# Patient Record
Sex: FEMALE | Race: BLACK OR AFRICAN AMERICAN | NOT HISPANIC OR LATINO | Employment: FULL TIME | URBAN - METROPOLITAN AREA
[De-identification: names, ages, dates, MRNs, and addresses within clinical notes are randomized per-mention and may not be internally consistent; named-entity substitution may affect disease eponyms.]

---

## 2022-03-03 LAB
ABO GROUP BLD: NORMAL
BLD GP AB SCN SERPL QL: NEGATIVE
HBV SURFACE AG SERPL QL IA: NORMAL
HIV 1+2 AB+HIV1 P24 AG SERPL QL IA: NORMAL
RH BLD: POSITIVE
RUBV IGG SERPL IA-ACNC: 2.69

## 2022-09-24 ENCOUNTER — HOSPITAL ENCOUNTER (INPATIENT)
Facility: MEDICAL CENTER | Age: 32
LOS: 1 days | End: 2022-09-25
Attending: OBSTETRICS & GYNECOLOGY | Admitting: OBSTETRICS & GYNECOLOGY
Payer: COMMERCIAL

## 2022-09-24 VITALS
TEMPERATURE: 99.3 F | DIASTOLIC BLOOD PRESSURE: 72 MMHG | HEIGHT: 66 IN | RESPIRATION RATE: 18 BRPM | HEART RATE: 78 BPM | OXYGEN SATURATION: 98 % | BODY MASS INDEX: 27.97 KG/M2 | WEIGHT: 174 LBS | SYSTOLIC BLOOD PRESSURE: 118 MMHG

## 2022-09-24 DIAGNOSIS — Z91.89 AT RISK FOR BREASTFEEDING DIFFICULTY: Primary | ICD-10-CM

## 2022-09-24 DIAGNOSIS — O47.9 UTERINE CONTRACTIONS: ICD-10-CM

## 2022-09-24 LAB
BASOPHILS # BLD AUTO: 0.2 % (ref 0–1.8)
BASOPHILS # BLD: 0.03 K/UL (ref 0–0.12)
EOSINOPHIL # BLD AUTO: 0.03 K/UL (ref 0–0.51)
EOSINOPHIL NFR BLD: 0.2 % (ref 0–6.9)
ERYTHROCYTE [DISTWIDTH] IN BLOOD BY AUTOMATED COUNT: 42.1 FL (ref 35.9–50)
ERYTHROCYTE [DISTWIDTH] IN BLOOD BY AUTOMATED COUNT: 42.8 FL (ref 35.9–50)
HCT VFR BLD AUTO: 33.1 % (ref 37–47)
HCT VFR BLD AUTO: 40.3 % (ref 37–47)
HGB BLD-MCNC: 11.2 G/DL (ref 12–16)
HGB BLD-MCNC: 13.4 G/DL (ref 12–16)
HOLDING TUBE BB 8507: NORMAL
IMM GRANULOCYTES # BLD AUTO: 0.12 K/UL (ref 0–0.11)
IMM GRANULOCYTES NFR BLD AUTO: 0.7 % (ref 0–0.9)
LYMPHOCYTES # BLD AUTO: 0.99 K/UL (ref 1–4.8)
LYMPHOCYTES NFR BLD: 5.9 % (ref 22–41)
MCH RBC QN AUTO: 29.7 PG (ref 27–33)
MCH RBC QN AUTO: 29.9 PG (ref 27–33)
MCHC RBC AUTO-ENTMCNC: 33.3 G/DL (ref 33.6–35)
MCHC RBC AUTO-ENTMCNC: 33.8 G/DL (ref 33.6–35)
MCV RBC AUTO: 88.3 FL (ref 81.4–97.8)
MCV RBC AUTO: 89.4 FL (ref 81.4–97.8)
MONOCYTES # BLD AUTO: 0.79 K/UL (ref 0–0.85)
MONOCYTES NFR BLD AUTO: 4.7 % (ref 0–13.4)
NEUTROPHILS # BLD AUTO: 14.9 K/UL (ref 2–7.15)
NEUTROPHILS NFR BLD: 88.3 % (ref 44–72)
NRBC # BLD AUTO: 0 K/UL
NRBC BLD-RTO: 0 /100 WBC
PLATELET # BLD AUTO: 174 K/UL (ref 164–446)
PLATELET # BLD AUTO: 203 K/UL (ref 164–446)
PMV BLD AUTO: 10.8 FL (ref 9–12.9)
PMV BLD AUTO: 11.1 FL (ref 9–12.9)
RBC # BLD AUTO: 3.75 M/UL (ref 4.2–5.4)
RBC # BLD AUTO: 4.51 M/UL (ref 4.2–5.4)
T PALLIDUM AB SER QL IA: NORMAL
WBC # BLD AUTO: 12.8 K/UL (ref 4.8–10.8)
WBC # BLD AUTO: 16.9 K/UL (ref 4.8–10.8)

## 2022-09-24 PROCEDURE — 36415 COLL VENOUS BLD VENIPUNCTURE: CPT

## 2022-09-24 PROCEDURE — 700105 HCHG RX REV CODE 258: Performed by: OBSTETRICS & GYNECOLOGY

## 2022-09-24 PROCEDURE — 59409 OBSTETRICAL CARE: CPT

## 2022-09-24 PROCEDURE — 700111 HCHG RX REV CODE 636 W/ 250 OVERRIDE (IP): Performed by: OBSTETRICS & GYNECOLOGY

## 2022-09-24 PROCEDURE — 770002 HCHG ROOM/CARE - OB PRIVATE (112)

## 2022-09-24 PROCEDURE — 700111 HCHG RX REV CODE 636 W/ 250 OVERRIDE (IP)

## 2022-09-24 PROCEDURE — 700102 HCHG RX REV CODE 250 W/ 637 OVERRIDE(OP): Performed by: OBSTETRICS & GYNECOLOGY

## 2022-09-24 PROCEDURE — 304965 HCHG RECOVERY SERVICES

## 2022-09-24 PROCEDURE — 86780 TREPONEMA PALLIDUM: CPT

## 2022-09-24 PROCEDURE — A9270 NON-COVERED ITEM OR SERVICE: HCPCS | Performed by: OBSTETRICS & GYNECOLOGY

## 2022-09-24 PROCEDURE — 85027 COMPLETE CBC AUTOMATED: CPT

## 2022-09-24 PROCEDURE — 85025 COMPLETE CBC W/AUTO DIFF WBC: CPT

## 2022-09-24 RX ORDER — OXYTOCIN 10 [USP'U]/ML
INJECTION, SOLUTION INTRAMUSCULAR; INTRAVENOUS
Status: COMPLETED
Start: 2022-09-24 | End: 2022-09-24

## 2022-09-24 RX ORDER — OXYTOCIN 10 [USP'U]/ML
10 INJECTION, SOLUTION INTRAMUSCULAR; INTRAVENOUS
Status: DISCONTINUED | OUTPATIENT
Start: 2022-09-24 | End: 2022-09-24 | Stop reason: HOSPADM

## 2022-09-24 RX ORDER — SODIUM CHLORIDE, SODIUM LACTATE, POTASSIUM CHLORIDE, CALCIUM CHLORIDE 600; 310; 30; 20 MG/100ML; MG/100ML; MG/100ML; MG/100ML
INJECTION, SOLUTION INTRAVENOUS CONTINUOUS
Status: DISCONTINUED | OUTPATIENT
Start: 2022-09-24 | End: 2022-09-24

## 2022-09-24 RX ORDER — SODIUM CHLORIDE, SODIUM LACTATE, POTASSIUM CHLORIDE, CALCIUM CHLORIDE 600; 310; 30; 20 MG/100ML; MG/100ML; MG/100ML; MG/100ML
INJECTION, SOLUTION INTRAVENOUS PRN
Status: DISCONTINUED | OUTPATIENT
Start: 2022-09-24 | End: 2022-09-25 | Stop reason: HOSPADM

## 2022-09-24 RX ORDER — MISOPROSTOL 200 UG/1
800 TABLET ORAL
Status: DISCONTINUED | OUTPATIENT
Start: 2022-09-24 | End: 2022-09-24 | Stop reason: HOSPADM

## 2022-09-24 RX ORDER — ONDANSETRON 2 MG/ML
4 INJECTION INTRAMUSCULAR; INTRAVENOUS EVERY 6 HOURS PRN
Status: DISCONTINUED | OUTPATIENT
Start: 2022-09-24 | End: 2022-09-24 | Stop reason: HOSPADM

## 2022-09-24 RX ORDER — CETIRIZINE HYDROCHLORIDE 10 MG/1
10 TABLET ORAL DAILY
COMMUNITY

## 2022-09-24 RX ORDER — ACETAMINOPHEN 500 MG
1000 TABLET ORAL
Status: DISCONTINUED | OUTPATIENT
Start: 2022-09-24 | End: 2022-09-24 | Stop reason: HOSPADM

## 2022-09-24 RX ORDER — DOCUSATE SODIUM 100 MG/1
100 CAPSULE, LIQUID FILLED ORAL 2 TIMES DAILY PRN
Status: DISCONTINUED | OUTPATIENT
Start: 2022-09-24 | End: 2022-09-25 | Stop reason: HOSPADM

## 2022-09-24 RX ORDER — IBUPROFEN 800 MG/1
800 TABLET ORAL
Status: COMPLETED | OUTPATIENT
Start: 2022-09-24 | End: 2022-09-24

## 2022-09-24 RX ORDER — ACETAMINOPHEN 500 MG
1000 TABLET ORAL EVERY 6 HOURS PRN
Status: DISCONTINUED | OUTPATIENT
Start: 2022-09-24 | End: 2022-09-25 | Stop reason: HOSPADM

## 2022-09-24 RX ORDER — LIDOCAINE HYDROCHLORIDE 10 MG/ML
20 INJECTION, SOLUTION INFILTRATION; PERINEURAL
Status: DISCONTINUED | OUTPATIENT
Start: 2022-09-24 | End: 2022-09-24 | Stop reason: HOSPADM

## 2022-09-24 RX ORDER — TERBUTALINE SULFATE 1 MG/ML
0.25 INJECTION, SOLUTION SUBCUTANEOUS
Status: DISCONTINUED | OUTPATIENT
Start: 2022-09-24 | End: 2022-09-24 | Stop reason: HOSPADM

## 2022-09-24 RX ORDER — MISOPROSTOL 200 UG/1
600 TABLET ORAL
Status: DISCONTINUED | OUTPATIENT
Start: 2022-09-24 | End: 2022-09-25 | Stop reason: HOSPADM

## 2022-09-24 RX ORDER — IBUPROFEN 800 MG/1
800 TABLET ORAL EVERY 8 HOURS PRN
Status: DISCONTINUED | OUTPATIENT
Start: 2022-09-24 | End: 2022-09-25 | Stop reason: HOSPADM

## 2022-09-24 RX ORDER — VITAMIN A ACETATE, BETA CAROTENE, ASCORBIC ACID, CHOLECALCIFEROL, .ALPHA.-TOCOPHEROL ACETATE, DL-, THIAMINE MONONITRATE, RIBOFLAVIN, NIACINAMIDE, PYRIDOXINE HYDROCHLORIDE, FOLIC ACID, CYANOCOBALAMIN, CALCIUM CARBONATE, FERROUS FUMARATE, ZINC OXIDE, CUPRIC OXIDE 3080; 12; 120; 400; 1; 1.84; 3; 20; 22; 920; 25; 200; 27; 10; 2 [IU]/1; UG/1; MG/1; [IU]/1; MG/1; MG/1; MG/1; MG/1; MG/1; [IU]/1; MG/1; MG/1; MG/1; MG/1; MG/1
1 TABLET, FILM COATED ORAL
Status: DISCONTINUED | OUTPATIENT
Start: 2022-09-25 | End: 2022-09-25 | Stop reason: HOSPADM

## 2022-09-24 RX ORDER — MISOPROSTOL 200 UG/1
TABLET ORAL
Status: DISCONTINUED
Start: 2022-09-24 | End: 2022-09-24

## 2022-09-24 RX ORDER — ONDANSETRON 4 MG/1
4 TABLET, ORALLY DISINTEGRATING ORAL EVERY 6 HOURS PRN
Status: DISCONTINUED | OUTPATIENT
Start: 2022-09-24 | End: 2022-09-24 | Stop reason: HOSPADM

## 2022-09-24 RX ORDER — METHYLERGONOVINE MALEATE 0.2 MG/ML
0.2 INJECTION INTRAVENOUS
Status: DISCONTINUED | OUTPATIENT
Start: 2022-09-24 | End: 2022-09-24 | Stop reason: HOSPADM

## 2022-09-24 RX ADMIN — OXYTOCIN 10 UNITS: 10 INJECTION, SOLUTION INTRAMUSCULAR; INTRAVENOUS at 08:28

## 2022-09-24 RX ADMIN — OXYTOCIN 125 ML/HR: 10 INJECTION INTRAVENOUS at 10:07

## 2022-09-24 RX ADMIN — IBUPROFEN 800 MG: 800 TABLET, FILM COATED ORAL at 18:17

## 2022-09-24 RX ADMIN — IBUPROFEN 800 MG: 800 TABLET, FILM COATED ORAL at 10:07

## 2022-09-24 ASSESSMENT — LIFESTYLE VARIABLES
TOTAL SCORE: 0
DOES PATIENT WANT TO STOP DRINKING: NO
HAVE PEOPLE ANNOYED YOU BY CRITICIZING YOUR DRINKING: NO
EVER HAD A DRINK FIRST THING IN THE MORNING TO STEADY YOUR NERVES TO GET RID OF A HANGOVER: NO
EVER_SMOKED: NEVER
EVER FELT BAD OR GUILTY ABOUT YOUR DRINKING: NO
HAVE YOU EVER FELT YOU SHOULD CUT DOWN ON YOUR DRINKING: NO
TOTAL SCORE: 0
CONSUMPTION TOTAL: INCOMPLETE
TOTAL SCORE: 0
ALCOHOL_USE: NO

## 2022-09-24 ASSESSMENT — PATIENT HEALTH QUESTIONNAIRE - PHQ9
SUM OF ALL RESPONSES TO PHQ9 QUESTIONS 1 AND 2: 0
1. LITTLE INTEREST OR PLEASURE IN DOING THINGS: NOT AT ALL
2. FEELING DOWN, DEPRESSED, IRRITABLE, OR HOPELESS: NOT AT ALL

## 2022-09-24 ASSESSMENT — COPD QUESTIONNAIRES
HAVE YOU SMOKED AT LEAST 100 CIGARETTES IN YOUR ENTIRE LIFE: NO/DON'T KNOW
IN THE PAST 12 MONTHS DO YOU DO LESS THAN YOU USED TO BECAUSE OF YOUR BREATHING PROBLEMS: DISAGREE/UNSURE
COPD SCREENING SCORE: 0
DO YOU EVER COUGH UP ANY MUCUS OR PHLEGM?: NO/ONLY WITH OCCASIONAL COLDS OR INFECTIONS
DURING THE PAST 4 WEEKS HOW MUCH DID YOU FEEL SHORT OF BREATH: NONE/LITTLE OF THE TIME

## 2022-09-24 ASSESSMENT — PAIN DESCRIPTION - PAIN TYPE
TYPE: ACUTE PAIN
TYPE: ACUTE PAIN

## 2022-09-24 NOTE — L&D DELIVERY NOTE
Date: 9/24/2022    PREOPERATIVE DIAGNOSIS:   1.  Term intrauterine pregnancy at 40+4 weeks.  2.  Active labor.  3.  Anxiety and depression.    POSTOPERATIVE DIAGNOSIS:   1.  Term intrauterine pregnancy at 40+4 weeks.  2.  Active labor.  3.  Anxiety and depression.    PROCEDURE: Normal spontaneous vaginal delivery.    Primary OB/GYN: Myriam silvestre MD    Delivering Physician: Lori Nogueira MD    Anesthesia: None.    Complications: None.    Estimated blood loss: 100 ml.    PROCEDURE DETAILS:   32 y.o. G 3 para 2-0-0-2 presented to [facility] labor and delivery with a chief complaint of spontaneous rupture membranes and contractions.  She was found to be completely dilated and wanting to push.  The fetal heart tracing prior to delivery demonstrated a normal baseline with moderate variability.  We were unable to determine whether or not decelerations were occurring due to the short duration of the tracing.  1 push prior to delivery with fetal heart tones were in the 80s and dropped down to the 60s.  The patient pushed well and delivered vaginally without epidural anesthesia. The infant was suctioned with the bulb. There was no nuchal cord but a body cord was present.  The infant was placed on the patient's abdomen after delivery. The cord was clamped after a 30 second delay and subsequently cut by the father of the baby. The fundus was firm with massage and IM pitocin. The placenta delivered spontaneously, intact, with normal 3-vessel cord, in carrillo presentation.     There were no cervical, vaginal, or perineal lacerations. Viable female infant (baby girl Jacque) weighs 3385 g (7 lbs, 7.4 oz.) Apgars were 8 and 8 at 1 and 5 minutes of life.    Mother and infant are recovering and doing well at this time. Sponge and needle counts were correct x 1.       Lori Nogueira M.D.

## 2022-09-24 NOTE — DISCHARGE PLANNING
Discharge Planning Assessment Post Partum    Reason for Referral: MOB hx of anxiety, currently on Zoloft  Address: ***  Type of Living Situation:***  Mom Diagnosis: ***  Baby Diagnosis: ***  Primary Language: ***    Name of Baby: ***  Father of the Baby: ***  Involved in baby’s care? ***  Contact Information: ***    Prenatal Care: ***  Mom's PCP: ***  PCP for new baby:***    Support System: ***  Coping/Bonding between mother & baby: ***  Source of Feeding: ***  Supplies for Infant: ***    Mom's Insurance: ***  Baby Covered on Insurance:***  Mother Employed/School: ***  Other children in the home/names & ages: ***    Financial Hardship/Income: ***   Mom's Mental status: ***  Services used prior to admit: ***    CPS History: ***  Psychiatric History: ***  Domestic Violence History: ***  Drug/ETOH History: ***    Resources Provided: ***  Referrals Made: ***     Clearance for Discharge: ***     Ongoing Plan:***

## 2022-09-24 NOTE — PROGRESS NOTES
Patient transferred from labor and delivery to room 334. Patient and  oriented to room and postpartum routine. Assessment done. Patient has no c/o pain. Lochia light  Fundus firm. IV site without redness, swelling or drainage.2nd bag pitocin placed on pump at 125/hr.cuddles alarm is active. Bands matched. Safety and security reviewed. Baby to breast and nursing well.

## 2022-09-24 NOTE — PROGRESS NOTES
0759: Charge RN called to notify me that pt is in active labor and came in SROM. Admitted to 217.   0815: Pt checked and complete.  called to bedside.   0824: Pt delivered viable female. APGARs 8,8.  0847: called to confirm she would still like IV access as pt's bleeding is minimal and stable. IV access still wanted per MD so IV placed in left AC.   0900: No rooms available in  so pt to stay on unit for now.   1100: Pt up to bathroom without issues. Voided and cleaned perineum with alex bottle.  1300:Pt eating lunch from what  brought to bedside.   1430: Pt up to bathroom. Bleeding none to scant.

## 2022-09-24 NOTE — DISCHARGE SUMMARY
Obstetrics Discharge Summary    Admission Date: 2022         Discharge Date: 2022    ADMISSION DIAGNOSIS:  1.  Term intrauterine pregnancy at 40+4 weeks.  2.  Active labor.  3.  Anxiety and depression.    DISCHARGE DIAGNOSIS:  1.  Term intrauterine pregnancy at 40+4 weeks.  2.  Active labor.  3.  Anxiety and depression.    DETAILS OF HOSPITAL STAY  Presenting Problem/History of Present Illness: Contractions.    Hospital Course:  Patient is a 32 y.o. , who presented to [facility] at 40+4 weeks with a chief complaint of contractions.  She was found to be completely dilated.  She had prenatal care with [facility] with Dr. Schofield.  Her pregnancy was complicated by anxiety and depression. For full details of the delivery please refer to the delivery note dictation. Briefly, the patient underwent an uncomplicated normal spontaneous vaginal delivery. There were no complications. Estimated blood loss was 100 ml. The patient delivered a viable female (baby girl Jacque) infant weighing 3,385g (7lbs 7.4oz.) with Apgars as below in delivery summary. Patient’s recovery and postpartum course were unremarkable. By postpartum day #1 patient met all appropriate milestones and was stable to be discharged to home.    APGARs:   8    8     COMPLICATIONS: None.    PHYSICAL EXAM:  Vitals:   Vitals:    22 1800   BP: 118/72   Pulse: 78   Resp: 18   Temp: 37.4 °C (99.3 °F)   SpO2: 98%   General: Alert, conversational, pleasant, no acute distress  CVS: Regular rate  PULM: No respiratory distress, symmetric expansion  ABD: Soft, non-tender, non-distended, fundus firm, non-tender, below the umbilicus  : Deferred  Extremities: Moves all, trace edema     LABS/STUDIES:    Latest Reference Range & Units 22 09:30 22 21:01   WBC 4.8 - 10.8 K/uL 16.9 (H) 12.8 (H)   RBC 4.20 - 5.40 M/uL 4.51 3.75 (L)   Hemoglobin 12.0 - 16.0 g/dL 13.4 11.2 (L)   Hematocrit 37.0 - 47.0 % 40.3 33.1 (L)   MCV  81.4 - 97.8 fL 89.4 88.3   MCH 27.0 - 33.0 pg 29.7 29.9   MCHC 33.6 - 35.0 g/dL 33.3 (L) 33.8   RDW 35.9 - 50.0 fL 42.8 42.1   Platelet Count 164 - 446 K/uL 203 174   MPV 9.0 - 12.9 fL 10.8 11.1     DISPOSITION: Home.    DISCHARGE MEDICATIONS:  - Ibuprofen 600 mg every 6 hours as needed for pain.    DISCHARGE INSTRUCTIONS:  1. Pelvic rest for 6 weeks postpartum.   2. Postpartum visit in 6 weeks at [facility] [phone].  3. Return to the emergency department if experiencing increased vaginal bleeding, severe pain, temperature greater than 100.4, or any other concerns.    DISCHARGE CONDITION: Stable.    Lori Nogueira M.D.

## 2022-09-24 NOTE — H&P
Date: 2022    Patient ID: 32-year-old  3 para 2-0-0-2 at 40+4 weeks by last menstrual period EDC 2022     Chief complaint: Contractions.    History of present illness: The patient has prenatal care with Dr. Schofield.  Her pregnancy has been uncomplicated.  She does have anxiety and has been taking Zoloft 50 mg once daily.  She had a normal nuchal translucency but did not complete the blood work for the first trimester screen.  Her carrier screen was negative and she is negative for sickle cell trait.  She had a normal anatomy ultrasound and is having a baby girl.  She had a growth ultrasound at 36 weeks with an estimated fetal weight in the 62%.  She is GBS negative.    The patient presented to [facility] labor and delivery with contractions.  She states she underwent spontaneous rupture of membranes at 1230 this morning.  She reports she has been laboring at home.  When she arrived she was complete and ready to push.  She endorsed positive fetal movement.  She denied vaginal bleeding.    Review of systems: Denies fevers, chills, shortness of breath, chest pain, nausea/vomiting, diarrhea or dysuria.    Past medical history: Anxiety and depression.    Past surgical history denies.    Allergies: Keflex (rash).    Medications: Prenatal vitamins, vitamin D3, Zoloft 50 mg once daily.    GYN history: Last menstrual period 2021, denies history of sexually transmitted infections or genital herpes.    OB history:  G1:  term normal spontaneous vaginal delivery, male, 7 pounds 11 ounces, no complications.  G2:  term normal spontaneous vaginal delivery, female 6 pounds 8 ounces, no complications.    Social history: Denies tobacco use, alcohol use or drug use.    Family history: Mother with uterine fibroids and paternal uncle with type 2 diabetes.    Physical exam:  Pending  General: Alert, conversational, pleasant  Cardiovascular: Regular rate  Pulmonary: No respiratory distress, symmetric  expansion  Abdomen: Soft, nontender, nondistended, gravid  Genitourinary: Complete/+1 station  Extremities: Moves all, no edema    Laboratory studies:  Prenatal labs reviewed A+ antibody negative, HIV nonreactive, rubella immune, RPR nonreactive, hepatitis C antibody negative, hepatitis B surface antigen negative, gonorrhea, chlamydia, trichomonas negative, carrier screening negative, varicella immune, vitamin D 37.2, urine culture negative, sickle cell screen negative, 1 hour glucose tolerance test 116, GBS negative.    Imaging: Anatomy ultrasound report shows a single live intrauterine pregnancy with normal anatomy female infant baby girl Jacque.    NST: The patient arrived precipitously and delivered.  There was about 3 minutes of fetal heart tracing prior to delivery.  Moderate variability.  Unable to determine whether decelerations are present.  No accelerations.  Tocometer, contractions every 1 minute.    Assessment/plan:  32-year-old  3 para 2-0-0-2 at 40+4 weeks by last menstrual period EDC 2022  1.  Term intrauterine pregnancy at 40+4 weeks.  2.  Active labor.  3.  Anxiety and depression.    Discussion: The patient arrived and delivered precipitously upon arrival.  Please see delivery note for details.    Plan:  1.  Admit to labor and delivery.  2.  CBC/type and screen.  3.  Routine postpartum orders.

## 2022-09-25 ENCOUNTER — PHARMACY VISIT (OUTPATIENT)
Dept: PHARMACY | Facility: MEDICAL CENTER | Age: 32
End: 2022-09-25
Payer: COMMERCIAL

## 2022-09-25 PROCEDURE — RXMED WILLOW AMBULATORY MEDICATION CHARGE: Performed by: OBSTETRICS & GYNECOLOGY

## 2022-09-25 PROCEDURE — 700102 HCHG RX REV CODE 250 W/ 637 OVERRIDE(OP): Performed by: OBSTETRICS & GYNECOLOGY

## 2022-09-25 PROCEDURE — A9270 NON-COVERED ITEM OR SERVICE: HCPCS | Performed by: OBSTETRICS & GYNECOLOGY

## 2022-09-25 RX ORDER — IBUPROFEN 800 MG/1
800 TABLET ORAL EVERY 8 HOURS PRN
Qty: 21 TABLET | Refills: 0 | Status: SHIPPED | OUTPATIENT
Start: 2022-09-25 | End: 2022-10-02

## 2022-09-25 RX ADMIN — PRENATAL WITH FERROUS FUM AND FOLIC ACID 1 TABLET: 3080; 920; 120; 400; 22; 1.84; 3; 20; 10; 1; 12; 200; 27; 25; 2 TABLET ORAL at 10:21

## 2022-09-25 ASSESSMENT — EDINBURGH POSTNATAL DEPRESSION SCALE (EPDS)
I HAVE FELT SCARED OR PANICKY FOR NO GOOD REASON: NO, NOT AT ALL
I HAVE BEEN ABLE TO LAUGH AND SEE THE FUNNY SIDE OF THINGS: AS MUCH AS I ALWAYS COULD
THINGS HAVE BEEN GETTING ON TOP OF ME: NO, MOST OF THE TIME I HAVE COPED QUITE WELL
I HAVE FELT SAD OR MISERABLE: NO, NOT AT ALL
I HAVE BEEN SO UNHAPPY THAT I HAVE HAD DIFFICULTY SLEEPING: NOT AT ALL
I HAVE BEEN ANXIOUS OR WORRIED FOR NO GOOD REASON: NO, NOT AT ALL
I HAVE LOOKED FORWARD WITH ENJOYMENT TO THINGS: AS MUCH AS I EVER DID
THE THOUGHT OF HARMING MYSELF HAS OCCURRED TO ME: NEVER
I HAVE BLAMED MYSELF UNNECESSARILY WHEN THINGS WENT WRONG: NOT VERY OFTEN
I HAVE BEEN SO UNHAPPY THAT I HAVE BEEN CRYING: NO, NEVER

## 2022-09-25 ASSESSMENT — PAIN DESCRIPTION - PAIN TYPE: TYPE: ACUTE PAIN

## 2022-09-25 NOTE — DISCHARGE PLANNING
Discharge Planning Assessment Post Partum     Reason for Referral: History of depression and anxiety  Address: [address] [place] [address] [place] [address]  Phone: [phone]  Type of Living Situation: living with FOB and children  Mom Diagnosis: Pregnancy  Baby Diagnosis: [place]-40.4 weeks  Primary Language: English     Name of Baby: Jacque Mayberry (: 22)  Father of the Baby: Elvis Mayberry  Involved in baby’s care? Yes  Contact Information: [phone]     Prenatal Care: Yes  Mom's PCP: Unknown  PCP for new baby: Pediatrician list provided     Support System: FOB  Coping/Bonding between mother & baby: Yes  Source of Feeding: breast feeding  Supplies for Infant: prepared for infant     Mom's Insurance: [place]  Baby Covered on Insurance:Yes  Mother Employed/School:  for the [employer]  Other children in the home/names & ages: this is parent's third baby     Financial Hardship/Income: No   Mom's Mental status: alert and oriented  Services used prior to admit: None     CPS History: No  Psychiatric History: history of depression and anxiety.  MOB is taking Zoloft 50 mg and scored a 2 on the EPDS screen  Domestic Violence History: No  Drug/ETOH History: No     Resources Provided: post partum support and counseling resources and a list of pediatricians provided  Referrals Made: None      Clearance for Discharge: Infant is cleared to discharge home with parents once medically cleared

## 2022-09-25 NOTE — PROGRESS NOTES
2030 Pt doing well bonding with baby, Assessment done with light bleeding, up to bathroom and voiding without difficulty, plan of care discussed, Denies pain at this time, Needs attended.

## 2022-09-25 NOTE — DISCHARGE INSTRUCTIONS

## 2022-09-25 NOTE — LACTATION NOTE
This note was copied from a baby's chart.  Initial visit, mother has a strong breastfeeding history, she is noticing some nipple beveling and discomfort with breastfeeding. Mother able to latch baby independently, reviewed asymmetrical latch and emphasizing chin first positioning to obtain deeper latch, nipple very slightly beveled after feeding. MD reports slight tongue tie, baby able to extend tongue over lower gum ridge, parents will get referral tomorrow at peds follow-up for ENT evaluation if latch adjustments not successful in alleviating maternal nipple discomfort. Reviewed milk onset, hunger cues, cluster feeding, frequency/duration of feeding, techniques to deepen latch. Plan to continue cue based breastfeeding at least 8 or more times per 24 hours. Provided list of available outpatient breastfeeding resources. Mother prefers to call to schedule lactation follow-up if desired. Denies questions/concerns.

## 2022-09-25 NOTE — CARE PLAN
Problem: Altered Physiologic Condition  Goal: Patient physiologically stable as evidenced by normal lochia, palpable uterine involution and vitals within normal limits  Outcome: Progressing     Problem: Psychosocial - Postpartum  Goal: Patient will verbalize and demonstrate effective bonding and parenting behavior  Outcome: Progressing   The patient is Stable - Low risk of patient condition declining or worsening pt is hemodynamically stable    Shift Goals: pain controlled, rest  Clinical Goals: maintain uterine good tone, prebvent bladder distension  Patient Goals: pain controlled, breastfeed, rest  Family Goals: rest    Progress made toward(s) clinical / shift goals:  pt verbalized acceptable level of pain    Patient is not progressing towards the following goals:

## 2022-09-26 NOTE — PROGRESS NOTES
1400 DC to home w/ female infant in a car seat w/ fob in stable condition. DC instructions reviewed w/ pt and fob, verbalized understanding. Meds to bed delivered.

## 2022-10-03 ENCOUNTER — OFFICE VISIT (OUTPATIENT)
Dept: OBGYN | Facility: CLINIC | Age: 32
End: 2022-10-03
Payer: COMMERCIAL

## 2022-10-03 DIAGNOSIS — O92.29 SORE NIPPLES DUE TO LACTATION: ICD-10-CM

## 2022-10-03 PROCEDURE — 99215 OFFICE O/P EST HI 40 MIN: CPT | Performed by: NURSE PRACTITIONER

## 2022-10-03 NOTE — PROGRESS NOTES
Summary:Third baby, has never experienced sore nipples but this time nipples have cracked and scabbed, and improving but baby continues with a shallow latch. Exclusively breastfeeding, uses the haakaa for leaking on the opposite side. No bottles. Mom has to wake up Jacque for most feedings.  Today: Oral assessment done as frenulum identified in the hospital. Assisted latched to the right, very full side, less pain, removed 1.2oz. Back to the same breast for practice and breast still full, had to awaken baby, removed 0.7oz and sleeping. Total 1.9oz. Pumping not indicated. Discussed work, pumping and nipple care.   Plan:Deep asymmetrical latch due to neck preference and tightness presenting in the baby. Soothies for nipple care. Introduce bottle by 4 weeks, no alarms at night with baby back to birthweight.   Follow up:   Lactation appointment :as needed, group offered   Pediatrician appointment:14 day [facility]  Referrals :None  Subjective:   Erin Mayberry is a 32 y.o. female here for lactation care. She is here today with baby.    Concerns:   Latch on difficulties , nipple pain , and sleepy baby  HPI:   Pertinent  history:   Mother does not have a history of advanced maternal age, GDM, hypertension prior to pregnancy, insulin resistance, multiple gestation, PCOS, and thyroid disease. Common condition(s) which may interfere with milk supply.    Mother does have advanced maternal age, GDM, hypertension prior to pregnancy, insulin resistance, multiple gestation, PCOS, and thyroid disease. Common condition(s) that may interfere in milk supply.    Breast changes in pregnancy: Yes  History of breast surgeries: No      Other risk factors Infant's MSK tightness    FEEDING HISTORY:    Past breastfeeding history:  First baby  18 months, #2 for 10 months self weaned  Hospital course: Lactation education provided  Currently 10/3/2022 hird baby, has never experienced sore nipples but this time nipples have  cracked and scabbed, and improving but baby continues with a shallow latch. Exclusively breastfeeding, uses the haakaa for leaking on the opposite side. No bottles. Mom has to wake up Jacque for most feedings.     Both breasts: No     Supplement: None    Nipple Shield Use: None    Breast Pumping:   Not pumping  Frequency: Uses the haaka  Type of pump: Has Medela max flow from last baby 19 month ago  Flange size/type: 19mm  NO pain with pumping    Maternal ROS:  Constitutional: No fever, chills. Feeling well  Breasts: No soreness of breasts and Soreness of nipples  Psychiatric: Managing ok  Mental Health: No mention of feeling irritable, agitated, angry, overwhelmed, apathy, exhaustion nor having sleep changes outside infant feeds/demands or appetite changes   Objective:     Maternal Physical Lactation Exam  General: No acute distress  Breasts: Symmetrical , Full, Plugged Duct - no evidence, and Mastitis  - no S/S  Nipples: abraded, cracked, erythematous, and scabbed/crusting  Psychiatric:  Normal mood and affect. Her behavior is normal. Judgment and thought content normal   Mental Health:  Did NOT exhibit sadness, crying, feeling overwhelmed, agitation or hypervigilance.    Assessment/Plan & Lactation Counseling:     Infant exam on infant chart    Infant Weight History:   9/24/22  7#7.4oz  10/3/2022 7#8.3oz day 9 back to birth weight  Infant intake at Breast:: 1.2on + 0.7oz Right breast         Total: 1.9oz  Milk Transfer at this feeding:   Effective breastfeeding  Pumped: not indiated  Initiation of Feeding: Infant initiates  Position of Feeding:    Right: football  Attachment Achieved: rapidly  Nipple shield: N/A       Suck Pattern at the breast: Suck burst and normal rest  Suck Pattern on the bottle: Not indicated  Behavior Following Observed Feeding: sleeping  Nipple Pain from:Contact forces of the tongue causing nipple strain resulting in damage     Latch: Assisted latch and Shallow latch  Suckling/Feeding:  attaches, audible swallows, baby fed effectively, baby roots, elicits RENNY, and rhythmic  Sucking strength:Moderate Strong  Sucking Rhythm Coordinated   Compression: WNL   Once latched, baby fell into a mature and fully integrated SSB pattern.    Swallowing No difficulty noted  Milk Supply Available: normal abundant  Maternal Diagnosis/Problem:  Sore nipples    MATERNAL PERSONALIZED BREASTFEEDING PLAN  Discussed concerns and symptoms as listed above in assessment and guidance summarized below.  Shared decision making was used between myself and the family for this encounter, home care, and follow up.  Topics reviewed included:    4th Trimester: The 12-week period immediately after mom has had the baby. Not everyone has heard of it, but every mother and their  baby will go through it. It is a time of great physical and emotional change as the baby adjusts to being outside the womb, and the family adjusts to new life as parents  During the fourth trimester, one can expect fussiness and crying from the baby and very likely exhaustion for the family.  babies are learning to adjust to life outside the womb where it was warm and squishy!  There is a lot of misinformation about babies and their needs, and parents are often encouraged to ignore baby's signals. Bad idea. Babies are “half-baked” at birth and have much to learn with the help of physical and emotional support from caregivers. Taking care of a baby's needs is an investment that pays off with a happier, healthier child and adult.  It can take weeks or even months for your body to feel totally normal again. There is a major hormonal upheaval experienced by moms in the first few weeks after birth, because their bodies are shifting from many pregnancy hormones to a more normal hormonal make-up.   Sleep or lack of: discussed strategies to manage restorative sleep, although short amounts, significant to the mental health of the mother. The principle  follows:  Mom goes to sleep right after 8pm +/- feeding  Partner covers first late evening feeding (10pm/11pm), settles baby,  then goes to bed  Mom covers next feeding 1am /2am, partner sleeps  Next feeding share   Self -care through relational support and interaction.   Encouraged  support, rest, getting out of the house each day, walk or drive somewhere,  a coffee/tea at a drive through  Allow others to bring you food, help with chores and errands, meeting for a cup of coffee   The nature of infants oral head/neck structure and function and its impact on latch and transfer of milk.   Discussed Mechanical forces resulting in strain patterns during intrauterine life and during birth may negatively affect the oral-motor function of the  and compromise structure and function.  Joint restriction or hypo-mobility could be a logical progression following the relative lack of mobility during the last crowded months of gestation. An exceptionally flexed or rotated fetal posture may tighten myofascial structures in the neck, restricting the hyoid bone and strap muscles that support an effective swallow. More research reveals the body as an integrated whole via its major structure-maintaining and sensory organ, the fascia.  Other musculoskeletal issues, such as neck preferences, may also affect an infant's ability to nurse. These views have expanded and deepened over time.   Neck preference this is a normal  finding that should correct itself over the next few weeks.    However when there is a neck preference it can make latching more difficult.    Mom can  use cross cradle on the left and football on the right    Infant head can be supported in the car seat.    Bottle feeding baby's can be encouraged to look to the opposite side of the preference  Infant can be can talked to from the side encouraging baby to turn to.     Milk supply is dependent on glandular tissue development, hormonal influences, how  many times the baby removes milk and how well the breasts are emptied in a 24 hour period. This is a biological reality that we can NOT work around. If, for any reason, your baby is not latching, or you are not able to nurse, then it is important for you to remove the milk instead by pumping or hand expression.  There's no magic trick, tea, food, drink, cookie or supplement that will increase your milk supply. One  must  effectively remove milk to continue to make and maximize milk. In the early days and weeks that can be 8+ times in 24 hours. For older babies, on average 6-7 + times in 24 hours.     Hydration: Staying hydrated is important however lack of hydration is usually not a cause of significantly low milk production.  Everyone needs a different amount of water, depending on their activity and diet. A high salt and/or high-protein diet, high physical activity, or very warm weather/sweating will require more fluids. A person eating a diet high in veggies and fruit, with a lack of physical activity will require fewer fluids. There is no magic number for the # of ounces of water each day.The best way to know that you are well hydrated is by looking at your urine.  Urine should look clear to light pale yellow, and you should need to pee at least every 3 to 4 hours unless you have a large bladder capacity.     Feeding:   Infant feeding well given current interval growth, guidelines to follow:    Feed your baby every 1.5-3 hours, more often if baby acts hungry.   Awaken baby for feeding if going over 3 hours in the day.   Daily goal: 8-12 feedings per 24 hours.    Given infants weight you may allow baby to go longer at night but that generally means shorter durations in the day.   Supplement:   No supplement is needed   Pacifier Use:  The American Academy of Pediatrics' Position Paper reports: Although we recommend a conservative approach regarding pacifier use, we do not endorse a complete ban on the use of  pacifiers, nor do we support an approach that induces parental guilt concerning their choices about the use of pacifiers. Pacifier use and breastfeeding in term and  newborns- a systematic review and meta-analysis from the  J of Pediatrics Published online 2022. Has found that when pacifiers are used among individuals who have been counseled on the risks, do not interfere with breastfeeding exclusivity or duration. These are parental choices.    Positioning Techniques for bare breast  Pillow used: Marissa Hartley  Suggested positions Cross cradle and Football  Fine tune position by making sure your fingers beneath the breast as well as your bra, are out of the way of your baby's chin.  Positioning: See http://globalhealthmedia.org/portfolio-items/positions-for-breastfeeding/?jlgswrrvqPE=49210   Latch on Techniques for bare breast.   Aim is an asymmetric deep latch.  First make yourself comfortable. Sit with knees bent (unless lying down), feet on a stool if needed. You will support your baby, and pillows will be used to support YOU. You want your baby's belly facing your breast/body (belly to belly). If your baby is extremely fussy and crying, do skin-to-skin for a while until he or she calms down, then try (or try again).   Fine tune latch:  By holding your baby more securely at the breast, assisting your baby to stay attached by:  Support your baby with your hand behind the shoulder blades (NOT THE HEAD).  1. Align your baby's NOSE with your nipple  2. Your baby's chin should be the first part of his or her body to touch your breast  3. Tickle the baby's upper lip or nose with your nipple  4. When your baby's mouth is WIDE OPEN, swiftly bring your baby's mouth over your nipple/areola.  Steps 2 and 3 could be slightly reversed order or essentially happening at the same time.  Bringing your baby to your breast, not breast to the baby  Your baby's cheek to touch breast securely, nose tipped  "back  Hold your baby firmly in place so when your baby forgets to suck and picks it back up again your baby is in the correct spot. You will be extinguishing behavior and replacing it with a deeper latch to stimulate suck and provide satisfaction at the breast.  Your baby needs as much breast as deep in the mouth as possible to allow your nipples to heal and for you more importantly to maximize efficiency at the breast  If that doesn’t help, you should make an appointment with me to re-evaluate.   Latch is asymmetrical, leading with the chin, getting the baby below the breast, as if offering a large \"deli naima sandwich\".  Here is a video from [employer]KO on the asymmetric latch- https://www.youViralizeube.com/watch?v=0I-NUc9Aj76     Pumping Guidelines:  Both breasts   Or ruben ot haakaa one side nursing the other  Pump 0-1 times in 24 hours  Type of pump:  LS9  May Check with insurance again about the spectra, if ordered:  Spectra Settings   Press letdown button when first starting         Cycle: 70 / Vacuum: L1 - L 5 (To comfort)  Once milk lets down, press letdown button again  Cycle: 54 / Vacuum: L1 - L12 (To comfort)  Always double pump  How long will vary woman to woman, typically 8-15 minutes, or 1-2 minutes after flow slows  Flange Fit: Freely moving nipple in the tunnel with some movement of the areola.    Caution with breast massage The word “Massage” means different things in the breastfeeding world. It is described and interpreted in so many different ways and unfortunately potentially harmful. The hands can be safe on a breast and  gentle to help in many ways but they also can be damaging when used in the wrong way.  Lymphatic drainage massage is appropriate,  open hand , lightly stroking only, and can be highly effective. The massage advice to knead , massage deeply , vibrate with marketed tools is  most concerning. Be gentle with this gland to not increase inflammation.     Storage (Acceptable guidelines for " healthy term babies)  10 hours at room temp including your pieces, may rinse but not mandatory  8 days refrigerator, don't need  to refrigerate right away if using fresh pumped milk at the next feeding  Freezer 1 year  Deep freezer 2 years  American Academy or Pediatrics has said you may mix different temperature milks.   If baby drinks breastmilk from a fresh or refrigerated bottle of breastmilk,  you may return the unused portion to the refrigerator and use once at next feeding.     Nipple care:      May apply breastmilk  Moist-oily ointment after feeding/pumping, ie Lanolin nipple butter, coconut or olive oil, if desired/needed 2-3 times/day until nipples are healed  You do not need to wash this off before pumping or feeding the baby  Hydrogels recommended  Hydrogels Soothies when you need to provide moisture, coconut oil is not thick enough     Connect with other mothers:  Facebook:   [place] Breast[facility]eds: https://www.iubenda.VenJuvo/[place].breastfeeds/  Well-Nourished Babies (Private group for questions and support): https://www.iubenda.com/groups/798736516771983/  Breastfeeding [facility]  WEIGHT CHECKS  Tuesdays 10am - 11am. Women's Health at [address], 3rd floor conference room  Check your baby's weight, do a feeding and see how your baby is growing, visit with other mothers, plan on a walk or coffee date after group.  Please wear a mask coming and going: you may remove it in the classroom  Due to space limitations - limit strollers please (New c/section moms please use your stroller).  We would love to have dads stay, but moms won't breastfeed if there are men in the room, sorry.  The room is generally scheduled for another event following group.  Please take all diapers with you   ONLINE SUPPORT GROUPS  [employer] (PSI) support groups are conducted using a qmee-ze-nqef support model and are not intended for those experiencing a mental health crisis. Groups are 90 minutes  (1.5 hours) in length. The first ~30 minutes is providing information, education, and establishing group guidelines. The next ~60 minutes is “talk time,” in which group members share and talk with each other. Group members must be present for the group guidelines before joining in the discussion or “talk time.”     In Conclusion:   Family present has verbalized what they can realistically do based on family dynamics, understanding a plan has to be doable to be effective and can be renegotiated at any time. Managing breastfeeding and milk supply is very dynamic,can be a complex and intimate journey, and is not one size fits all. When obstacles present themselves, it takes confidence, persistence and support. The rights of the child include optimal nutrition and mothers need help to make informed decisions. You  and your baby have been screened for biological, psychological, and social risk factors that might interfere with achieving your goals.  Support is critical. You are now the focus of our Breastfeeding Medicine team; we are here to support your decisions and vision.     Follow up   Follow-up for infant weight check and dyad breastfeeding evaluation or sore nipples as needed     Please call [phone] if you have not scheduled your next appointment  Family is encouraged to e-mail us to update how the plan is working.    A total of 60  minutes, not including infant assessment time, with more than 50% was spent preparing to see the patient, obtaining and reviewing separately obtained history, performing a medically appropriate examination and evaluation, counseling and educating the family,documenting clinical information in the electronic health record, independently interpreting weighted feeds and infant growth results, communicating these results to the family and care coordination as detailed in the above note.       GARRETT Ornelas.

## 2024-02-25 ENCOUNTER — HOSPITAL ENCOUNTER (OUTPATIENT)
Facility: MEDICAL CENTER | Age: 34
End: 2024-02-25
Attending: NURSE PRACTITIONER
Payer: COMMERCIAL

## 2024-02-25 ENCOUNTER — OFFICE VISIT (OUTPATIENT)
Dept: URGENT CARE | Facility: PHYSICIAN GROUP | Age: 34
End: 2024-02-25
Payer: COMMERCIAL

## 2024-02-25 VITALS
RESPIRATION RATE: 12 BRPM | WEIGHT: 144 LBS | HEART RATE: 77 BPM | TEMPERATURE: 97.7 F | BODY MASS INDEX: 23.14 KG/M2 | SYSTOLIC BLOOD PRESSURE: 110 MMHG | DIASTOLIC BLOOD PRESSURE: 70 MMHG | HEIGHT: 66 IN | OXYGEN SATURATION: 99 %

## 2024-02-25 DIAGNOSIS — N76.0 ACUTE VAGINITIS: ICD-10-CM

## 2024-02-25 LAB
APPEARANCE UR: CLEAR
BILIRUB UR STRIP-MCNC: NORMAL MG/DL
COLOR UR AUTO: YELLOW
GLUCOSE UR STRIP.AUTO-MCNC: NORMAL MG/DL
KETONES UR STRIP.AUTO-MCNC: NORMAL MG/DL
LEUKOCYTE ESTERASE UR QL STRIP.AUTO: NORMAL
NITRITE UR QL STRIP.AUTO: NORMAL
PH UR STRIP.AUTO: 5.5 [PH] (ref 5–8)
POCT INT CON NEG: NEGATIVE
POCT INT CON POS: POSITIVE
POCT URINE PREGNANCY TEST: NEGATIVE
PROT UR QL STRIP: NORMAL MG/DL
RBC UR QL AUTO: NORMAL
SP GR UR STRIP.AUTO: 1.02
UROBILINOGEN UR STRIP-MCNC: 0.2 MG/DL

## 2024-02-25 PROCEDURE — 81025 URINE PREGNANCY TEST: CPT | Performed by: NURSE PRACTITIONER

## 2024-02-25 PROCEDURE — 87510 GARDNER VAG DNA DIR PROBE: CPT

## 2024-02-25 PROCEDURE — 99203 OFFICE O/P NEW LOW 30 MIN: CPT | Performed by: NURSE PRACTITIONER

## 2024-02-25 PROCEDURE — 81002 URINALYSIS NONAUTO W/O SCOPE: CPT | Performed by: NURSE PRACTITIONER

## 2024-02-25 PROCEDURE — 87660 TRICHOMONAS VAGIN DIR PROBE: CPT

## 2024-02-25 PROCEDURE — 3078F DIAST BP <80 MM HG: CPT | Performed by: NURSE PRACTITIONER

## 2024-02-25 PROCEDURE — 1126F AMNT PAIN NOTED NONE PRSNT: CPT | Performed by: NURSE PRACTITIONER

## 2024-02-25 PROCEDURE — 3074F SYST BP LT 130 MM HG: CPT | Performed by: NURSE PRACTITIONER

## 2024-02-25 PROCEDURE — 87480 CANDIDA DNA DIR PROBE: CPT

## 2024-02-25 RX ORDER — FLUCONAZOLE 150 MG/1
150 TABLET ORAL ONCE
Qty: 1 TABLET | Refills: 0 | Status: SHIPPED | OUTPATIENT
Start: 2024-02-25 | End: 2024-02-25

## 2024-02-25 ASSESSMENT — ENCOUNTER SYMPTOMS
CONSTITUTIONAL NEGATIVE: 1
VAGINITIS: 1
FEVER: 0
BACK PAIN: 0
ABDOMINAL PAIN: 0
FLANK PAIN: 0

## 2024-02-25 ASSESSMENT — PAIN SCALES - GENERAL: PAINLEVEL: NO PAIN

## 2024-02-25 ASSESSMENT — VISUAL ACUITY: OU: 1

## 2024-02-25 NOTE — PROGRESS NOTES
"Subjective:     Erin Mayberry is a 33 y.o. female who presents for Vaginitis (X 1 week )       Vaginitis  The patient's primary symptoms include genital itching and vaginal discharge. The patient's pertinent negatives include no genital lesions or genital odor. This is a new problem. Episode onset: 1 week ago. The problem has been gradually worsening. Associated symptoms include dysuria. Pertinent negatives include no abdominal pain, back pain, fever or flank pain. The vaginal discharge was mucoid. Exacerbated by: Walking.     Denies concern for STD.    Review of Systems   Constitutional: Negative.  Negative for fever.   Gastrointestinal:  Negative for abdominal pain.   Genitourinary:  Positive for dysuria and vaginal discharge. Negative for flank pain.        Itching   Musculoskeletal:  Negative for back pain.   Skin:  Positive for itching.   All other systems reviewed and are negative.    Refer to HPI for additional details.    During this visit, appropriate PPE was worn, and hand hygiene was performed.    PMH:  has no past medical history on file.    MEDS:   Current Outpatient Medications:     fluconazole (DIFLUCAN) 150 MG tablet, Take 1 Tablet by mouth one time for 1 dose., Disp: 1 Tablet, Rfl: 0    sertraline (ZOLOFT) 50 MG Tab, Take 50 mg by mouth every day., Disp: , Rfl:     cetirizine (ZYRTEC) 10 MG Tab, Take 10 mg by mouth every day., Disp: , Rfl:     ALLERGIES:   Allergies   Allergen Reactions    Keflex Rash     rash     SURGHX: History reviewed. No pertinent surgical history.  SOCHX:      FH: Per [facility] as applicable/pertinent.      Objective:     /70 (BP Location: Left arm, Patient Position: Sitting, BP Cuff Size: Adult)   Pulse 77   Temp 36.5 °C (97.7 °F) (Temporal)   Resp 12   Ht 1.676 m (5' 6\")   Wt 65.3 kg (144 lb)   SpO2 99%   BMI 23.24 kg/m²     Physical Exam  Nursing note reviewed.   Constitutional:       General: She is not in acute distress.     Appearance: She is " well-developed. She is not ill-appearing or toxic-appearing.   Eyes:      General: Vision grossly intact.   Cardiovascular:      Rate and Rhythm: Normal rate.   Pulmonary:      Effort: Pulmonary effort is normal. No respiratory distress.   Musculoskeletal:         General: No deformity. Normal range of motion.   Skin:     Coloration: Skin is not pale.   Neurological:      Mental Status: She is alert and oriented to person, place, and time.      Motor: No weakness.   Psychiatric:         Behavior: Behavior normal. Behavior is cooperative.     UA: unremarkable    POCT pregnancy: negative      Assessment/Plan:     1. Acute vaginitis  - POCT Urinalysis  - VAGINAL PATHOGENS DNA PANEL; Future  - fluconazole (DIFLUCAN) 150 MG tablet; Take 1 Tablet by mouth one time for 1 dose.  Dispense: 1 Tablet; Refill: 0  - POCT PREGNANCY    Rx as above sent electronically. Swab pending. OTC ibuprofen, Vagisil, or Cortizone 10 cream PRN.    Differential diagnosis, natural history, supportive care, over-the-counter symptom management per 's instructions, close monitoring, and indications for immediate follow-up discussed.     All questions answered. Patient agrees with the plan of care.    Discharge summary provided via [employer].

## 2024-02-26 LAB
CANDIDA DNA VAG QL PROBE+SIG AMP: POSITIVE
G VAGINALIS DNA VAG QL PROBE+SIG AMP: NEGATIVE
T VAGINALIS DNA VAG QL PROBE+SIG AMP: NEGATIVE

## 2024-02-28 ENCOUNTER — TELEPHONE (OUTPATIENT)
Dept: URGENT CARE | Facility: PHYSICIAN GROUP | Age: 34
End: 2024-02-28
Payer: COMMERCIAL

## 2024-02-28 DIAGNOSIS — B37.31 CANDIDAL VULVOVAGINITIS: ICD-10-CM

## 2024-02-28 RX ORDER — FLUCONAZOLE 150 MG/1
TABLET ORAL
Qty: 1 TABLET | Refills: 0 | Status: SHIPPED | OUTPATIENT
Start: 2024-02-28

## 2025-04-18 ENCOUNTER — PHARMACY VISIT (OUTPATIENT)
Dept: PHARMACY | Facility: MEDICAL CENTER | Age: 35
End: 2025-04-18
Payer: COMMERCIAL

## 2025-04-18 PROCEDURE — RXMED WILLOW AMBULATORY MEDICATION CHARGE
